# Patient Record
Sex: MALE | Race: WHITE | NOT HISPANIC OR LATINO | URBAN - METROPOLITAN AREA
[De-identification: names, ages, dates, MRNs, and addresses within clinical notes are randomized per-mention and may not be internally consistent; named-entity substitution may affect disease eponyms.]

---

## 2018-10-30 ENCOUNTER — NEW PATIENT (OUTPATIENT)
Dept: URBAN - METROPOLITAN AREA CLINIC 51 | Facility: CLINIC | Age: 53
End: 2018-10-30
Payer: COMMERCIAL

## 2018-10-30 DIAGNOSIS — H01.009 UNSPECIFIED BLEPHARITIS UNSPECIFIED EYE, UNSPECIFIED EYELID: ICD-10-CM

## 2018-10-30 DIAGNOSIS — E11.9 TYPE 2 DIABETES MELLITUS WITHOUT COMPLICATIONS: Primary | ICD-10-CM

## 2018-10-30 PROCEDURE — 92004 COMPRE OPH EXAM NEW PT 1/>: CPT | Performed by: OPTOMETRIST

## 2018-10-30 ASSESSMENT — KERATOMETRY
OD: 42.70
OS: 42.70

## 2018-10-30 ASSESSMENT — INTRAOCULAR PRESSURE
OD: 18
OS: 18

## 2018-10-30 ASSESSMENT — VISUAL ACUITY
OD: 20/20
OS: 20/20

## 2022-04-07 ENCOUNTER — OFFICE VISIT (OUTPATIENT)
Dept: URBAN - METROPOLITAN AREA CLINIC 51 | Facility: CLINIC | Age: 57
End: 2022-04-07
Payer: COMMERCIAL

## 2022-04-07 DIAGNOSIS — H35.013 CHANGES IN RETINAL VASCULAR APPEARANCE, BILATERAL: ICD-10-CM

## 2022-04-07 DIAGNOSIS — H43.313 VITREOUS MEMBRANES AND STRANDS, BILATERAL: Primary | ICD-10-CM

## 2022-04-07 DIAGNOSIS — Z79.84 LONG TERM (CURRENT) USE OF ORAL ANTIDIABETIC DRUGS: ICD-10-CM

## 2022-04-07 PROCEDURE — 92004 COMPRE OPH EXAM NEW PT 1/>: CPT | Performed by: OPTOMETRIST

## 2022-04-07 PROCEDURE — 92250 FUNDUS PHOTOGRAPHY W/I&R: CPT | Performed by: OPTOMETRIST

## 2022-04-07 ASSESSMENT — VISUAL ACUITY
OD: 20/20
OS: 20/20

## 2022-04-07 ASSESSMENT — INTRAOCULAR PRESSURE
OD: 15
OS: 14

## 2022-04-07 NOTE — IMPRESSION/PLAN
Impression: Changes in retinal vascular appearance, bilateral: H35.013.  Plan: vessels are tortuous , monitor for Cardiovascular risk factors

## 2024-11-15 ENCOUNTER — OFFICE VISIT (OUTPATIENT)
Dept: URBAN - METROPOLITAN AREA CLINIC 51 | Facility: CLINIC | Age: 59
End: 2024-11-15
Payer: COMMERCIAL

## 2024-11-15 DIAGNOSIS — H35.013 CHANGES IN RETINAL VASCULAR APPEARANCE, BILATERAL: ICD-10-CM

## 2024-11-15 DIAGNOSIS — H25.013 CORTICAL AGE-RELATED CATARACT, BILATERAL: ICD-10-CM

## 2024-11-15 DIAGNOSIS — H52.4 PRESBYOPIA: ICD-10-CM

## 2024-11-15 DIAGNOSIS — H43.313 VITREOUS MEMBRANES AND STRANDS, BILATERAL: ICD-10-CM

## 2024-11-15 DIAGNOSIS — E11.9 TYPE 2 DIABETES MELLITUS WITHOUT COMPLICATIONS: Primary | ICD-10-CM

## 2024-11-15 DIAGNOSIS — Z79.84 LONG TERM (CURRENT) USE OF ORAL ANTIDIABETIC DRUGS: ICD-10-CM

## 2024-11-15 PROCEDURE — 92014 COMPRE OPH EXAM EST PT 1/>: CPT | Performed by: OPTOMETRIST

## 2024-11-15 PROCEDURE — 92134 CPTRZ OPH DX IMG PST SGM RTA: CPT | Performed by: OPTOMETRIST

## 2024-11-15 ASSESSMENT — INTRAOCULAR PRESSURE
OS: 18
OD: 17

## 2024-11-15 ASSESSMENT — KERATOMETRY
OD: 42.63
OS: 42.50

## 2024-11-15 ASSESSMENT — VISUAL ACUITY
OS: 20/20
OD: 20/20